# Patient Record
Sex: FEMALE | Race: WHITE
[De-identification: names, ages, dates, MRNs, and addresses within clinical notes are randomized per-mention and may not be internally consistent; named-entity substitution may affect disease eponyms.]

---

## 2018-03-03 ENCOUNTER — CHARTING TRANS (OUTPATIENT)
Dept: OTHER | Age: 28
End: 2018-03-03

## 2018-11-01 VITALS
RESPIRATION RATE: 20 BRPM | BODY MASS INDEX: 34.86 KG/M2 | WEIGHT: 230 LBS | SYSTOLIC BLOOD PRESSURE: 98 MMHG | TEMPERATURE: 98.2 F | DIASTOLIC BLOOD PRESSURE: 62 MMHG | HEIGHT: 68 IN | HEART RATE: 80 BPM

## 2019-07-20 ENCOUNTER — WALK IN (OUTPATIENT)
Dept: URGENT CARE | Age: 29
End: 2019-07-20

## 2019-07-20 VITALS
WEIGHT: 230 LBS | HEIGHT: 68 IN | TEMPERATURE: 98.2 F | RESPIRATION RATE: 16 BRPM | BODY MASS INDEX: 34.86 KG/M2 | SYSTOLIC BLOOD PRESSURE: 126 MMHG | DIASTOLIC BLOOD PRESSURE: 88 MMHG

## 2019-07-20 DIAGNOSIS — H61.22 IMPACTED CERUMEN OF LEFT EAR: Primary | ICD-10-CM

## 2019-07-20 PROCEDURE — 99213 OFFICE O/P EST LOW 20 MIN: CPT | Performed by: NURSE PRACTITIONER

## 2019-07-20 SDOH — HEALTH STABILITY: MENTAL HEALTH: HOW OFTEN DO YOU HAVE A DRINK CONTAINING ALCOHOL?: NEVER

## 2019-07-20 ASSESSMENT — ENCOUNTER SYMPTOMS
CONSTITUTIONAL NEGATIVE: 1
RESPIRATORY NEGATIVE: 1
PSYCHIATRIC NEGATIVE: 1
EYES NEGATIVE: 1

## 2022-11-05 ENCOUNTER — HOSPITAL ENCOUNTER (OUTPATIENT)
Age: 32
Discharge: HOME | End: 2022-11-05
Payer: COMMERCIAL

## 2022-11-05 VITALS
HEART RATE: 123 BPM | SYSTOLIC BLOOD PRESSURE: 137 MMHG | RESPIRATION RATE: 24 BRPM | DIASTOLIC BLOOD PRESSURE: 83 MMHG | TEMPERATURE: 103 F | OXYGEN SATURATION: 98 %

## 2022-11-05 DIAGNOSIS — J10.1 INFLUENZA A: ICD-10-CM

## 2022-11-05 DIAGNOSIS — R50.9 FEVER: Primary | ICD-10-CM

## 2022-11-05 LAB
POCT INFLUENZA A: POSITIVE
POCT INFLUENZA B: NEGATIVE
S PYO AG THROAT QL: NEGATIVE

## 2022-11-05 RX ORDER — ACETAMINOPHEN 500 MG
1000 TABLET ORAL ONCE
Status: COMPLETED | OUTPATIENT
Start: 2022-11-05 | End: 2022-11-05

## 2022-11-05 NOTE — ED INITIAL ASSESSMENT (HPI)
Pt reports sinus congestion, fever, and cough which started yesterday. Pt's son is strep positive.  Did not take medication for fever

## 2022-11-05 NOTE — DISCHARGE INSTRUCTIONS
Follow up with your primary care provider. You have Influenza, this is a strong virus. It requires a lot of supportive care, rest, and fluids. There is no antibiotic as it is not a bacterial infection. Increase water intake. DRINK A LOT OF WATER, Toribio Ramos is good too if you are not eating well. This has electrolytes and will help with hydration. Rest. Wear a mask if you will be around others. Medications you can  over the counter at any pharmacy:    Take Tylenol extra strength 500mg, 1-2 tablets every 6 hours for pain, fevers, bodyaches, and chills. Take ibuprofen (Advil, Motrin) 2 to 3 tablets every 6 hours for fever, bodyaches, and pain. Sudafed 30mg- 1-2 tablets every 6 hours. This will help with the pressure like headache you have and ear pain/ringing. Likely your sinuses are draining causing this and this will help. Robitussin DM or Mucinex DM- this is cough suppressant and expectorant so will help with cough you are having. Return or go to ER if having worsening chest pain, dizziness, vomiting, and fever > 101 despite use of Tylenol or Motrin.